# Patient Record
(demographics unavailable — no encounter records)

---

## 2024-10-29 NOTE — DISCUSSION/SUMMARY
[de-identified] : The patient has bilateral knee osteoarthritis. They are not an appropriate candidate for surgical intervention at this time. An extensive discussion was conducted on the natural history of the disease and the variety of surgical and non-surgical options available to the patient including, but not limited to non-steroidal anti-inflammatory medications, steroid injections, physical therapy, maintenance of ideal body weight, and reduction of activity.  Today we prescribed physical therapy.  I recommended a prescription of Mobic but the patient would prefer to use OTC NSAIDs at this time. The patient will schedule an appointment as needed.   Informed consent for right knee injection  was obtained.  All risks, benefits and alternatives were discussed. These included but were not limited to bleeding, infection and allergic reaction. All questions were answered. A time out was performed. Right  knee were prepped and draped in sterile fashion. Using sterile technique, 40mg of Kenalog, 4cc of 1% lidocaine, 4cc of 0.25% marcaine using a 21-gauge needle. A sterile dressing was applied. Post injection instructions were reviewed. The patient tolerated the procedure well.

## 2024-10-29 NOTE — PHYSICAL EXAM
[de-identified] : Well developed, well nourished in no apparent distress, awake, alert and orientated to person, place and time. with appropriate mood and affect. Respirations are even and unlabored. Gait evaluation does reveal a limp. There is no inguinal adenopathy. The legs are well-perfused, without skin lesions, shows a grossly normal motor and sensory examination. Knee motion does cause significant pain. ROM of both knees are 0-120 degrees.  5 degrees varus The knees are stable within that range-of-motion to AP and ML stress. Muscle strength is normal. Pedal pulses are palpable. [de-identified] :  Long standing  knee, AP knee, lateral knee, tunnel and patellar views of the both knees were ordered and taken in the office and demonstrate degenerative joint disease of the knee with joint space narrowing, osteophyte formation, and subchondral sclerosis.

## 2024-10-29 NOTE — HISTORY OF PRESENT ILLNESS
[de-identified] : 77 year old  female experiencing  pain in both knees R>L which is severe in intensity and has been going on for at least 3 months now.  She has high anxiety and is deathly afraid of needles.  The pain limits activities of daily living. Walking tolerance is  reduced. The patient denies any radiation of the pain to the feet and it is not associated with numbness, tingling, or weakness.